# Patient Record
Sex: FEMALE | Race: WHITE | Employment: FULL TIME | URBAN - METROPOLITAN AREA
[De-identification: names, ages, dates, MRNs, and addresses within clinical notes are randomized per-mention and may not be internally consistent; named-entity substitution may affect disease eponyms.]

---

## 2017-06-21 DIAGNOSIS — Z00.00 HEALTH CARE MAINTENANCE: ICD-10-CM

## 2017-06-21 DIAGNOSIS — J30.81 NON-SEASONAL ALLERGIC RHINITIS DUE TO ANIMAL HAIR AND DANDER: ICD-10-CM

## 2017-06-21 LAB
ANION GAP SERPL CALCULATED.3IONS-SCNC: 19 MMOL/L (ref 3–16)
BUN BLDV-MCNC: 10 MG/DL (ref 7–20)
CALCIUM SERPL-MCNC: 9.9 MG/DL (ref 8.3–10.6)
CHLORIDE BLD-SCNC: 100 MMOL/L (ref 99–110)
CHOLESTEROL, TOTAL: 117 MG/DL (ref 0–199)
CO2: 21 MMOL/L (ref 21–32)
CREAT SERPL-MCNC: 0.6 MG/DL (ref 0.6–1.1)
GFR AFRICAN AMERICAN: >60
GFR NON-AFRICAN AMERICAN: >60
GLUCOSE BLD-MCNC: 107 MG/DL (ref 70–99)
HDLC SERPL-MCNC: 54 MG/DL (ref 40–60)
LDL CHOLESTEROL CALCULATED: 56 MG/DL
POTASSIUM SERPL-SCNC: 4.4 MMOL/L (ref 3.5–5.1)
SODIUM BLD-SCNC: 140 MMOL/L (ref 136–145)
TRIGL SERPL-MCNC: 33 MG/DL (ref 0–150)
VLDLC SERPL CALC-MCNC: 7 MG/DL

## 2018-06-21 PROBLEM — L40.9 PSORIASIS: Status: ACTIVE | Noted: 2018-06-21

## 2018-06-21 PROBLEM — B00.9 HERPES SIMPLEX: Status: ACTIVE | Noted: 2018-06-21

## 2019-02-14 PROBLEM — G89.29 CHRONIC PELVIC PAIN IN FEMALE: Status: ACTIVE | Noted: 2019-02-14

## 2019-02-14 PROBLEM — R10.2 CHRONIC PELVIC PAIN IN FEMALE: Status: ACTIVE | Noted: 2019-02-14

## 2019-02-14 PROBLEM — N83.201 BILATERAL OVARIAN CYSTS: Status: ACTIVE | Noted: 2019-02-14

## 2019-02-14 PROBLEM — N83.202 BILATERAL OVARIAN CYSTS: Status: ACTIVE | Noted: 2019-02-14

## 2020-01-10 PROBLEM — N73.0 PELVIC INFLAMMATORY DISEASE, ACUTE: Status: ACTIVE | Noted: 2020-01-10

## 2020-03-02 PROBLEM — R94.31 ABNORMAL EKG: Status: ACTIVE | Noted: 2020-03-02

## 2021-02-23 PROBLEM — F51.01 PRIMARY INSOMNIA: Status: ACTIVE | Noted: 2021-02-23

## 2021-12-09 PROBLEM — E66.9 NON MORBID OBESITY, UNSPECIFIED OBESITY TYPE: Status: ACTIVE | Noted: 2021-12-09

## 2022-06-06 PROBLEM — E66.01 SEVERE OBESITY (BMI 35.0-39.9) WITH COMORBIDITY (HCC): Status: ACTIVE | Noted: 2021-12-09

## 2022-06-08 PROBLEM — G47.33 OSA (OBSTRUCTIVE SLEEP APNEA): Status: ACTIVE | Noted: 2022-06-08

## 2023-04-17 ENCOUNTER — NURSE ONLY (OUTPATIENT)
Dept: ENT CLINIC | Age: 28
End: 2023-04-17
Payer: COMMERCIAL

## 2023-04-17 ENCOUNTER — OFFICE VISIT (OUTPATIENT)
Dept: DERMATOLOGY | Age: 28
End: 2023-04-17
Payer: COMMERCIAL

## 2023-04-17 DIAGNOSIS — L21.9 SEBORRHEIC DERMATITIS: ICD-10-CM

## 2023-04-17 DIAGNOSIS — B00.1 HERPES LABIALIS: Primary | ICD-10-CM

## 2023-04-17 DIAGNOSIS — J30.1 NON-SEASONAL ALLERGIC RHINITIS DUE TO POLLEN: Primary | Chronic | ICD-10-CM

## 2023-04-17 PROCEDURE — G8427 DOCREV CUR MEDS BY ELIG CLIN: HCPCS | Performed by: DERMATOLOGY

## 2023-04-17 PROCEDURE — G8417 CALC BMI ABV UP PARAM F/U: HCPCS | Performed by: DERMATOLOGY

## 2023-04-17 PROCEDURE — 95115 IMMUNOTHERAPY ONE INJECTION: CPT | Performed by: OTOLARYNGOLOGY

## 2023-04-17 PROCEDURE — 1036F TOBACCO NON-USER: CPT | Performed by: DERMATOLOGY

## 2023-04-17 PROCEDURE — 99213 OFFICE O/P EST LOW 20 MIN: CPT | Performed by: DERMATOLOGY

## 2023-04-17 NOTE — PROGRESS NOTES
determined to be abnormal: Head/face. Well appearing. 1.  Clear. 2.  Right nasofacial sulcus with faint erythema. Assessment and Plan     1. Herpes labialis, frequently recurrent - much improved on daily suppressive therapy     Continue Valtrex 500 mg daily. 2. Chronic facial seborrheic dermatitis of the face - under good control    Continue Elidel cream 1 to 2 times daily as needed. Return in about 1 year (around 4/17/2024).     --Yue Carmen MD

## 2023-04-17 NOTE — PROGRESS NOTES
Correct serum vials verified by patient and nurse. Consent obtained and SVT completed. SVT P=13mm wheal, M=7mm wheal.  After obtaining consent, and per orders of Dr Ghada Bowser, injections of Pollen allergy serum held, M,M,Epi,I given by Nic Trejo RN. Patient instructed to remain in clinic for 30 minutes after injection, and to report any adverse reactions to me immediately. No change in patient status post injection. Patient instructed on use of EpiPen, signs of allergic reaction, and rationale for bringing EpiPen to each appointment. Patient verbalized understanding and provided accurate return demonstration of EpiPen use with  pen.

## 2023-04-21 ENCOUNTER — OFFICE VISIT (OUTPATIENT)
Dept: FAMILY MEDICINE CLINIC | Age: 28
End: 2023-04-21

## 2023-04-21 VITALS
SYSTOLIC BLOOD PRESSURE: 108 MMHG | WEIGHT: 237 LBS | HEART RATE: 74 BPM | TEMPERATURE: 98.3 F | OXYGEN SATURATION: 97 % | DIASTOLIC BLOOD PRESSURE: 70 MMHG | HEIGHT: 70 IN | BODY MASS INDEX: 33.93 KG/M2 | RESPIRATION RATE: 14 BRPM

## 2023-04-21 DIAGNOSIS — R23.2 CUTANEOUS FLUSHING DUE TO ALCOHOL: Primary | ICD-10-CM

## 2023-04-21 NOTE — PROGRESS NOTES
Cori Rutledge (:  1995) is a 32 y.o. female,Established patient, here for evaluation of the following chief complaint(s): Other (Requesting referral for allergy testing)         ASSESSMENT/PLAN:  [facility] was seen today for other. 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 Subjective   SUBJECTIVE/OBJECTIVE:  HPI  Pt is a of 32 y.o. female comes in today with   Chief Complaint   Patient presents with    Other     Requesting referral for allergy testing     Rare etoh use. Last few months. Vitals:    23 0858   BP: 108/70   Site: Right Upper Arm   Position: Sitting   Cuff Size: Large Adult   Pulse: 74   Resp: 14   Temp: 98.3 °F (36.8 °C)   TempSrc: Temporal   SpO2: 97%   Weight: 237 lb (107.5 kg)   Height: 5' 10\" (1.778 m)      Review of Systems       Objective   Physical Exam         An electronic signature was used to authenticate this note.     --Maximo Molina MD

## 2023-04-24 ENCOUNTER — NURSE ONLY (OUTPATIENT)
Dept: ENT CLINIC | Age: 28
End: 2023-04-24
Payer: COMMERCIAL

## 2023-04-24 DIAGNOSIS — J30.9 ALLERGIC RHINITIS, UNSPECIFIED SEASONALITY, UNSPECIFIED TRIGGER: Primary | ICD-10-CM

## 2023-04-24 PROCEDURE — 95117 IMMUNOTHERAPY INJECTIONS: CPT | Performed by: STUDENT IN AN ORGANIZED HEALTH CARE EDUCATION/TRAINING PROGRAM

## 2023-04-24 NOTE — PROGRESS NOTES
Correct serum vials verified by patient and nurse. After obtaining consent, and per orders of Dr Erica Pacheco, injections of allergy serum given by Mor Georges RN. Patient instructed to remain in clinic for 30 minutes after injection, and to report any adverse reactions to me immediately. No change in patient status post injection.

## 2023-05-01 ENCOUNTER — NURSE ONLY (OUTPATIENT)
Dept: ENT CLINIC | Age: 28
End: 2023-05-01
Payer: COMMERCIAL

## 2023-05-01 DIAGNOSIS — J30.9 ALLERGIC RHINITIS, UNSPECIFIED SEASONALITY, UNSPECIFIED TRIGGER: Primary | ICD-10-CM

## 2023-05-01 PROCEDURE — 95117 IMMUNOTHERAPY INJECTIONS: CPT | Performed by: STUDENT IN AN ORGANIZED HEALTH CARE EDUCATION/TRAINING PROGRAM

## 2023-05-01 NOTE — PROGRESS NOTES
Correct serum vials verified by patient and nurse. After obtaining consent, and per orders of Dr Rohith Oviedo, injections of allergy serum given by Rashid Pablo RN. Patient instructed to remain in clinic for 30 minutes after injection, and to report any adverse reactions to me immediately. No change in patient status post injection.

## 2023-05-15 ENCOUNTER — NURSE ONLY (OUTPATIENT)
Dept: ENT CLINIC | Age: 28
End: 2023-05-15
Payer: COMMERCIAL

## 2023-05-15 DIAGNOSIS — J30.9 ALLERGIC RHINITIS, UNSPECIFIED SEASONALITY, UNSPECIFIED TRIGGER: Primary | ICD-10-CM

## 2023-05-15 PROCEDURE — 95117 IMMUNOTHERAPY INJECTIONS: CPT | Performed by: STUDENT IN AN ORGANIZED HEALTH CARE EDUCATION/TRAINING PROGRAM

## 2023-05-15 NOTE — PROGRESS NOTES
Correct serum vials verified by patient and nurse. After obtaining consent, and per orders of Dr Mary Baig, injections of allergy serum given by Ash Castro RN. Patient instructed to remain in clinic for 30 minutes after injection, and to report any adverse reactions to me immediately. No change in patient status post injection.

## 2023-05-22 ENCOUNTER — NURSE ONLY (OUTPATIENT)
Dept: ENT CLINIC | Age: 28
End: 2023-05-22
Payer: COMMERCIAL

## 2023-05-22 DIAGNOSIS — J30.9 ALLERGIC RHINITIS, UNSPECIFIED SEASONALITY, UNSPECIFIED TRIGGER: Primary | ICD-10-CM

## 2023-05-22 PROCEDURE — 95117 IMMUNOTHERAPY INJECTIONS: CPT | Performed by: STUDENT IN AN ORGANIZED HEALTH CARE EDUCATION/TRAINING PROGRAM

## 2023-05-22 NOTE — PROGRESS NOTES
Correct serum vials verified by patient and nurse. After obtaining consent, and per orders of Dr Re Mac, injections of allergy serum given by Marshall Stevenson RN. Patient instructed to remain in clinic for 30 minutes after injection, and to report any adverse reactions to me immediately. No change in patient status post injection.

## 2023-05-31 ENCOUNTER — NURSE ONLY (OUTPATIENT)
Dept: ENT CLINIC | Age: 28
End: 2023-05-31
Payer: COMMERCIAL

## 2023-05-31 DIAGNOSIS — J30.9 ALLERGIC RHINITIS, UNSPECIFIED SEASONALITY, UNSPECIFIED TRIGGER: Primary | ICD-10-CM

## 2023-05-31 PROCEDURE — 95117 IMMUNOTHERAPY INJECTIONS: CPT | Performed by: OTOLARYNGOLOGY

## 2023-05-31 NOTE — PROGRESS NOTES
Correct serum vials verified by patient and nurse. After obtaining consent, and per orders of Dr Marilyn Lyons, injections of allergy serum given by Benjamín Ohara MA. Patient instructed to remain in clinic for 30 minutes after injection, and to report any adverse reactions to me immediately. No change in patient status post injection.

## 2023-06-05 ENCOUNTER — NURSE ONLY (OUTPATIENT)
Dept: ENT CLINIC | Age: 28
End: 2023-06-05
Payer: COMMERCIAL

## 2023-06-05 DIAGNOSIS — J30.9 ALLERGIC RHINITIS, UNSPECIFIED SEASONALITY, UNSPECIFIED TRIGGER: Primary | ICD-10-CM

## 2023-06-05 PROCEDURE — 95117 IMMUNOTHERAPY INJECTIONS: CPT | Performed by: OTOLARYNGOLOGY

## 2023-06-05 NOTE — PROGRESS NOTES
Correct serum vials verified by patient and nurse. After obtaining consent, and per orders of Dr Romeo Valverde, injections of allergy serum given by Vitaly Garcia RN. Patient instructed to remain in clinic for 30 minutes after injection, and to report any adverse reactions to me immediately. No change in patient status post injection.

## 2023-06-19 ENCOUNTER — NURSE ONLY (OUTPATIENT)
Dept: ENT CLINIC | Age: 28
End: 2023-06-19
Payer: COMMERCIAL

## 2023-06-19 DIAGNOSIS — J30.9 ALLERGIC RHINITIS, UNSPECIFIED SEASONALITY, UNSPECIFIED TRIGGER: Primary | ICD-10-CM

## 2023-06-19 PROCEDURE — 95117 IMMUNOTHERAPY INJECTIONS: CPT | Performed by: STUDENT IN AN ORGANIZED HEALTH CARE EDUCATION/TRAINING PROGRAM

## 2023-06-19 NOTE — PROGRESS NOTES
Correct serum vials verified by patient and nurse. After obtaining consent, and per orders of Dr Marilyn Lyons, injections of allergy serum given by Sunny Thomas RN. Patient instructed to remain in clinic for 30 minutes after injection, and to report any adverse reactions to me immediately. No change in patient status post injection.

## 2023-07-03 ENCOUNTER — NURSE ONLY (OUTPATIENT)
Dept: ENT CLINIC | Age: 28
End: 2023-07-03
Payer: COMMERCIAL

## 2023-07-03 DIAGNOSIS — J30.9 ALLERGIC RHINITIS, UNSPECIFIED SEASONALITY, UNSPECIFIED TRIGGER: Primary | ICD-10-CM

## 2023-07-03 PROCEDURE — 95117 IMMUNOTHERAPY INJECTIONS: CPT | Performed by: OTOLARYNGOLOGY

## 2023-07-03 NOTE — PROGRESS NOTES
Correct serum vials verified by patient and nurse. After obtaining consent, and per orders of Dr Jamie Rodriguez, injections of allergy serum given by Pino Linda RN. Patient instructed to remain in clinic for 30 minutes after injection, and to report any adverse reactions to me immediately. No change in patient status post injection.

## 2023-07-05 ENCOUNTER — TELEPHONE (OUTPATIENT)
Dept: ENT CLINIC | Age: 28
End: 2023-07-05

## 2023-07-06 NOTE — TELEPHONE ENCOUNTER
Patient has been tolerating allergy desensitization injections without issue. Agree with advancing treatment set to the neck stronger concentration and continuing escalated buildup.     Dr. Arlen Phipps, [facility]   Otolaryngology - Head & Neck [facility]

## 2023-07-10 ENCOUNTER — NURSE ONLY (OUTPATIENT)
Dept: ENT CLINIC | Age: 28
End: 2023-07-10
Payer: COMMERCIAL

## 2023-07-10 ENCOUNTER — NURSE ONLY (OUTPATIENT)
Dept: ENT CLINIC | Age: 28
End: 2023-07-10

## 2023-07-10 DIAGNOSIS — J30.1 NON-SEASONAL ALLERGIC RHINITIS DUE TO POLLEN: Primary | Chronic | ICD-10-CM

## 2023-07-10 PROCEDURE — 95165 ANTIGEN THERAPY SERVICES: CPT | Performed by: STUDENT IN AN ORGANIZED HEALTH CARE EDUCATION/TRAINING PROGRAM

## 2023-07-10 PROCEDURE — 95117 IMMUNOTHERAPY INJECTIONS: CPT | Performed by: STUDENT IN AN ORGANIZED HEALTH CARE EDUCATION/TRAINING PROGRAM

## 2023-07-10 NOTE — PROGRESS NOTES
Correct serum vials verified by patient and nurse. After obtaining consent, and per orders of Dr Dominick Deluna, injections of allergy serum given by Sandra Yuen RN. Patient instructed to remain in clinic for 30 minutes after injection, and to report any adverse reactions to me immediately. No change in patient status post injection.

## 2023-07-17 ENCOUNTER — NURSE ONLY (OUTPATIENT)
Dept: ENT CLINIC | Age: 28
End: 2023-07-17
Payer: COMMERCIAL

## 2023-07-17 DIAGNOSIS — J30.1 NON-SEASONAL ALLERGIC RHINITIS DUE TO POLLEN: Primary | Chronic | ICD-10-CM

## 2023-07-17 PROCEDURE — 95117 IMMUNOTHERAPY INJECTIONS: CPT | Performed by: STUDENT IN AN ORGANIZED HEALTH CARE EDUCATION/TRAINING PROGRAM

## 2023-07-17 NOTE — PROGRESS NOTES
Correct serum vials verified by patient and nurse. Consent obtained and SVT completed. SVT P=8mm wheal, M=10mm wheal.  After obtaining consent, and per orders of Dr Flores Membreno, injections of allergy serum given by MIKE Schwarz, [address] [place] [address]. Patient instructed to remain in clinic for 30 minutes after injection, and to report any adverse reactions to me immediately. No change in patient status post injection.

## 2023-07-24 ENCOUNTER — NURSE ONLY (OUTPATIENT)
Dept: ENT CLINIC | Age: 28
End: 2023-07-24
Payer: COMMERCIAL

## 2023-07-24 DIAGNOSIS — J30.1 NON-SEASONAL ALLERGIC RHINITIS DUE TO POLLEN: Primary | Chronic | ICD-10-CM

## 2023-07-24 PROCEDURE — 95117 IMMUNOTHERAPY INJECTIONS: CPT | Performed by: OTOLARYNGOLOGY

## 2023-07-24 NOTE — PROGRESS NOTES
Correct serum vials verified by patient and nurse. After obtaining consent, and per orders of Dr Rand Merrill, injections of allergy serum given by MIKE Burk, [address] [place] [address]. Patient instructed to remain in clinic for 30 minutes after injection, and to report any adverse reactions to me immediately. No change in patient status post injection.

## 2023-07-31 ENCOUNTER — NURSE ONLY (OUTPATIENT)
Dept: ENT CLINIC | Age: 28
End: 2023-07-31
Payer: COMMERCIAL

## 2023-07-31 DIAGNOSIS — J30.1 NON-SEASONAL ALLERGIC RHINITIS DUE TO POLLEN: Primary | Chronic | ICD-10-CM

## 2023-07-31 PROCEDURE — 95117 IMMUNOTHERAPY INJECTIONS: CPT | Performed by: STUDENT IN AN ORGANIZED HEALTH CARE EDUCATION/TRAINING PROGRAM

## 2023-07-31 NOTE — PROGRESS NOTES
Correct serum vials verified by patient and nurse. After obtaining consent, and per orders of Dr Stacia Bernal, injections of allergy serum given by MIKE Tovar, [address] [place] [address]. Patient instructed to remain in clinic for 30 minutes after injection, and to report any adverse reactions to me immediately. No change in patient status post injection.

## 2023-08-07 ENCOUNTER — NURSE ONLY (OUTPATIENT)
Dept: ENT CLINIC | Age: 28
End: 2023-08-07
Payer: COMMERCIAL

## 2023-08-07 DIAGNOSIS — J30.1 NON-SEASONAL ALLERGIC RHINITIS DUE TO POLLEN: Primary | Chronic | ICD-10-CM

## 2023-08-07 PROCEDURE — 95117 IMMUNOTHERAPY INJECTIONS: CPT | Performed by: OTOLARYNGOLOGY

## 2023-08-07 NOTE — PROGRESS NOTES
Correct serum vials verified by patient and nurse. After obtaining consent, and per orders of Dr Flores Membreno, injections of allergy serum given by Razia Holguin MA. Patient instructed to remain in clinic for 30 minutes after injection, and to report any adverse reactions to me immediately. No change in patient status post injection.

## 2023-08-14 ENCOUNTER — NURSE ONLY (OUTPATIENT)
Dept: ENT CLINIC | Age: 28
End: 2023-08-14
Payer: COMMERCIAL

## 2023-08-14 DIAGNOSIS — J30.1 NON-SEASONAL ALLERGIC RHINITIS DUE TO POLLEN: Primary | Chronic | ICD-10-CM

## 2023-08-14 PROCEDURE — 95117 IMMUNOTHERAPY INJECTIONS: CPT | Performed by: STUDENT IN AN ORGANIZED HEALTH CARE EDUCATION/TRAINING PROGRAM

## 2023-08-14 RX ORDER — VALACYCLOVIR HYDROCHLORIDE 1 G/1
TABLET, FILM COATED ORAL
Qty: 20 TABLET | Refills: 0 | Status: SHIPPED | OUTPATIENT
Start: 2023-08-14

## 2023-08-14 NOTE — PROGRESS NOTES
Correct serum vials verified by patient and nurse. After obtaining consent, and per orders of Dr Brenda Easley, injections of allergy serum given by MIKE Lopez, [address] [place] [address]. Patient instructed to remain in clinic for 30 minutes after injection, and to report any adverse reactions to me immediately. No change in patient status post injection.

## 2023-08-21 ENCOUNTER — NURSE ONLY (OUTPATIENT)
Dept: ENT CLINIC | Age: 28
End: 2023-08-21
Payer: COMMERCIAL

## 2023-08-21 DIAGNOSIS — J30.1 NON-SEASONAL ALLERGIC RHINITIS DUE TO POLLEN: Primary | Chronic | ICD-10-CM

## 2023-08-21 PROCEDURE — 95117 IMMUNOTHERAPY INJECTIONS: CPT | Performed by: STUDENT IN AN ORGANIZED HEALTH CARE EDUCATION/TRAINING PROGRAM

## 2023-08-21 NOTE — PROGRESS NOTES
Correct serum vials verified by patient and nurse. After obtaining consent, and per orders of Dr Marcial Oseguera, injections of allergy serum given by MIKE Wang, [address] [place] [address]. Patient instructed to remain in clinic for 30 minutes after injection, and to report any adverse reactions to me immediately. No change in patient status post injection.

## 2023-08-24 ENCOUNTER — OFFICE VISIT (OUTPATIENT)
Dept: ENT CLINIC | Age: 28
End: 2023-08-24
Payer: COMMERCIAL

## 2023-08-24 VITALS
SYSTOLIC BLOOD PRESSURE: 111 MMHG | WEIGHT: 246 LBS | TEMPERATURE: 97.5 F | OXYGEN SATURATION: 97 % | HEIGHT: 70 IN | BODY MASS INDEX: 35.22 KG/M2 | HEART RATE: 92 BPM | DIASTOLIC BLOOD PRESSURE: 79 MMHG

## 2023-08-24 DIAGNOSIS — J30.9 ALLERGIC RHINITIS, UNSPECIFIED SEASONALITY, UNSPECIFIED TRIGGER: Primary | ICD-10-CM

## 2023-08-24 PROCEDURE — G8417 CALC BMI ABV UP PARAM F/U: HCPCS | Performed by: STUDENT IN AN ORGANIZED HEALTH CARE EDUCATION/TRAINING PROGRAM

## 2023-08-24 PROCEDURE — 99213 OFFICE O/P EST LOW 20 MIN: CPT | Performed by: STUDENT IN AN ORGANIZED HEALTH CARE EDUCATION/TRAINING PROGRAM

## 2023-08-24 PROCEDURE — 1036F TOBACCO NON-USER: CPT | Performed by: STUDENT IN AN ORGANIZED HEALTH CARE EDUCATION/TRAINING PROGRAM

## 2023-08-24 PROCEDURE — G8427 DOCREV CUR MEDS BY ELIG CLIN: HCPCS | Performed by: STUDENT IN AN ORGANIZED HEALTH CARE EDUCATION/TRAINING PROGRAM

## 2023-08-24 RX ORDER — EPINEPHRINE 0.3 MG/.3ML
0.3 INJECTION SUBCUTANEOUS ONCE
Qty: 0.3 ML | Refills: 0 | Status: SHIPPED
Start: 2023-08-24 | End: 2023-08-25

## 2023-08-24 NOTE — PROGRESS NOTES
[address] [facility] FOLLOW-UP VISIT      Patient Name: Rafal [address] Record Number:  [id number]  Primary Care Physician:  Brandie Armando MD    ChiefComplaint     Chief Complaint   Patient presents with    Post-Op Check     F/u septoplasty, needs refill on epi pen       History of Present Illness     Mary Adkins is an 25 y.o. female previously seen for allergic rhinitis, deviated nasal septum, bilateral inferior turbinate hypertrophy. S/p septoplasty and bilateral inferior turbinate reduction on 1/4/2023. Interval History:   A couple days after getting shots will get itching around the site of injection and runny nose. Will also get tired after her shots. No breathing issues. No face or lip swelling. Breathing better through nose since surgery with occasional crusting in the nostrils.      Past Medical History     Past Medical History:   Diagnosis Date    Eczema     Endometriosis     Environmental allergies     IBS (irritable bowel syndrome)     Ovarian cyst     Psoriasis     Sleep apnea     does not currently use CPAP       Past Surgical History     Past Surgical History:   Procedure Laterality Date    ANTERIOR CRUCIATE LIGAMENT REPAIR Left 2011    APPENDECTOMY  2008    APPENDECTOMY      OVARIAN CYST SURGERY Bilateral     OVARY SURGERY Bilateral     removal of endometriosis from ovaries    SALPINGO-OOPHORECTOMY Left 02/14/2019    DIAGNOSTIC LAPAROSCOPY, ROBOTIC LEFT AND RIGHT OVARIAN  ENDOMETRIOMAS AND REMOVAL OF ENDOMETRIOSIS performed by Pola Murguia MD at [facility] patient still has ovaries    SINUS ENDOSCOPY Bilateral 1/4/2023    SEPTOPLASTY; BILATERAL INTERIOR TURBINATE REDUCTION performed by Otis Matta DO at [address]       Family History     Family History   Problem Relation Age of Onset    Diabetes Mother     Hypertension Mother     Migraines Mother     Asthma Mother     Arthritis Mother     Diabetes Father     Sleep Apnea Father

## 2023-08-25 ENCOUNTER — TELEPHONE (OUTPATIENT)
Dept: ENT CLINIC | Age: 28
End: 2023-08-25

## 2023-08-25 DIAGNOSIS — J30.9 ALLERGIC RHINITIS, UNSPECIFIED SEASONALITY, UNSPECIFIED TRIGGER: Primary | ICD-10-CM

## 2023-08-25 DIAGNOSIS — J30.9 ALLERGIC RHINITIS, UNSPECIFIED SEASONALITY, UNSPECIFIED TRIGGER: ICD-10-CM

## 2023-08-25 RX ORDER — EPINEPHRINE 0.3 MG/.3ML
INJECTION SUBCUTANEOUS
Qty: 2 EACH | Refills: 0 | Status: SHIPPED | OUTPATIENT
Start: 2023-08-25

## 2023-08-25 RX ORDER — EPINEPHRINE 0.3 MG/.3ML
0.3 INJECTION SUBCUTANEOUS ONCE
Qty: 0.3 ML | Refills: 0 | Status: SHIPPED | OUTPATIENT
Start: 2023-08-25 | End: 2023-08-25

## 2023-08-25 NOTE — TELEPHONE ENCOUNTER
If you could please send her epi pen to the new pharmacy on file. She will need the pen for her immunotherapy on Monday.

## 2023-08-28 ENCOUNTER — NURSE ONLY (OUTPATIENT)
Dept: ENT CLINIC | Age: 28
End: 2023-08-28
Payer: COMMERCIAL

## 2023-08-28 DIAGNOSIS — J30.1 NON-SEASONAL ALLERGIC RHINITIS DUE TO POLLEN: Primary | Chronic | ICD-10-CM

## 2023-08-28 PROCEDURE — 95117 IMMUNOTHERAPY INJECTIONS: CPT | Performed by: OTOLARYNGOLOGY

## 2023-08-28 NOTE — PROGRESS NOTES
Correct serum vials verified by patient and nurse. After obtaining consent, and per orders of Dr Latha Tobar, injections of allergy serum given by MIKE More, [address] [place] [address]. Patient instructed to remain in clinic for 30 minutes after injection, and to report any adverse reactions to me immediately. No change in patient status post injection.

## 2023-09-05 ENCOUNTER — NURSE ONLY (OUTPATIENT)
Dept: ENT CLINIC | Age: 28
End: 2023-09-05
Payer: COMMERCIAL

## 2023-09-05 DIAGNOSIS — J30.1 NON-SEASONAL ALLERGIC RHINITIS DUE TO POLLEN: Primary | Chronic | ICD-10-CM

## 2023-09-05 PROCEDURE — 95117 IMMUNOTHERAPY INJECTIONS: CPT | Performed by: STUDENT IN AN ORGANIZED HEALTH CARE EDUCATION/TRAINING PROGRAM

## 2023-09-05 NOTE — PROGRESS NOTES
Correct serum vials verified by patient and nurse. After obtaining consent, and per orders of Dr Geeta Head, injections of allergy serum given by MIKE Busch, [facility]. Patient instructed to remain in clinic for 30 minutes after injection, and to report any adverse reactions to me immediately. No change in patient status post injection.

## 2023-09-11 ENCOUNTER — NURSE ONLY (OUTPATIENT)
Dept: ENT CLINIC | Age: 28
End: 2023-09-11
Payer: COMMERCIAL

## 2023-09-11 DIAGNOSIS — J30.1 NON-SEASONAL ALLERGIC RHINITIS DUE TO POLLEN: Primary | Chronic | ICD-10-CM

## 2023-09-11 PROCEDURE — 95117 IMMUNOTHERAPY INJECTIONS: CPT | Performed by: STUDENT IN AN ORGANIZED HEALTH CARE EDUCATION/TRAINING PROGRAM

## 2023-09-11 RX ORDER — VALACYCLOVIR HYDROCHLORIDE 500 MG/1
500 TABLET, FILM COATED ORAL DAILY
Qty: 30 TABLET | Refills: 6 | Status: SHIPPED | OUTPATIENT
Start: 2023-09-11

## 2023-09-11 NOTE — PROGRESS NOTES
Correct serum vials verified by patient and nurse. After obtaining consent, and per orders of Dr Robert Teresa, injections of allergy serum given by MIKE Bliss, [address] [place] [address]. Patient instructed to remain in clinic for 30 minutes after injection, and to report any adverse reactions to me immediately. No change in patient status post injection.

## 2023-09-18 ENCOUNTER — TELEPHONE (OUTPATIENT)
Dept: ENT CLINIC | Age: 28
End: 2023-09-18

## 2023-09-18 ENCOUNTER — NURSE ONLY (OUTPATIENT)
Dept: ENT CLINIC | Age: 28
End: 2023-09-18
Payer: COMMERCIAL

## 2023-09-18 DIAGNOSIS — J30.1 NON-SEASONAL ALLERGIC RHINITIS DUE TO POLLEN: Primary | Chronic | ICD-10-CM

## 2023-09-18 PROCEDURE — 95117 IMMUNOTHERAPY INJECTIONS: CPT | Performed by: STUDENT IN AN ORGANIZED HEALTH CARE EDUCATION/TRAINING PROGRAM

## 2023-09-18 NOTE — TELEPHONE ENCOUNTER
Patient has been tolerating allergy desensitization injections without issue. Agree with advancing treatment set to the neck stronger concentration and continuing escalated buildup.     Dr. José Miguel Jacinto, [facility]   Otolaryngology - Head & Neck [facility]

## 2023-09-18 NOTE — PROGRESS NOTES
Correct serum vials verified by patient and nurse. After obtaining consent, and per orders of Dr Diogo Klein, injections of allergy serum given by MIKE Little, [address] [place] [address]. Patient instructed to remain in clinic for 30 minutes after injection, and to report any adverse reactions to me immediately. No change in patient status post injection.

## 2023-09-21 ENCOUNTER — NURSE ONLY (OUTPATIENT)
Dept: ENT CLINIC | Age: 28
End: 2023-09-21
Payer: COMMERCIAL

## 2023-09-21 DIAGNOSIS — J30.1 NON-SEASONAL ALLERGIC RHINITIS DUE TO POLLEN: Primary | Chronic | ICD-10-CM

## 2023-09-21 PROCEDURE — 95165 ANTIGEN THERAPY SERVICES: CPT | Performed by: STUDENT IN AN ORGANIZED HEALTH CARE EDUCATION/TRAINING PROGRAM

## 2023-09-25 ENCOUNTER — NURSE ONLY (OUTPATIENT)
Dept: ENT CLINIC | Age: 28
End: 2023-09-25
Payer: COMMERCIAL

## 2023-09-25 DIAGNOSIS — J30.1 NON-SEASONAL ALLERGIC RHINITIS DUE TO POLLEN: Primary | Chronic | ICD-10-CM

## 2023-09-25 PROCEDURE — 95117 IMMUNOTHERAPY INJECTIONS: CPT | Performed by: OTOLARYNGOLOGY

## 2023-09-25 NOTE — PROGRESS NOTES
Correct serum vials verified by patient and nurse. After obtaining consent, and per orders of Dr Luis Felipe Conte, injections of allergy serum given by MIKE Castellanos Reeves, [address] [place] [address]. Patient instructed to remain in clinic for 30 minutes after injection, and to report any adverse reactions to me immediately. No change in patient status post injection.

## 2023-10-02 ENCOUNTER — NURSE ONLY (OUTPATIENT)
Dept: ENT CLINIC | Age: 28
End: 2023-10-02
Payer: COMMERCIAL

## 2023-10-02 PROCEDURE — 95115 IMMUNOTHERAPY ONE INJECTION: CPT | Performed by: STUDENT IN AN ORGANIZED HEALTH CARE EDUCATION/TRAINING PROGRAM

## 2023-10-02 NOTE — PROGRESS NOTES
Correct serum vials verified by patient and nurse. Consent obtained and SVT completed. SVT P=13+ mm wheal, M=9mm wheal.  After obtaining consent, and per orders of Dr Liane Wallace, held Pollen injection today and allergy serum of M,M,Epi,I given by Kinsey Billingsley RN. Patient instructed to remain in clinic for 30 minutes after injection, and to report any adverse reactions to me immediately. No change in patient status post injection.

## 2023-10-09 ENCOUNTER — NURSE ONLY (OUTPATIENT)
Dept: ENT CLINIC | Age: 28
End: 2023-10-09
Payer: COMMERCIAL

## 2023-10-09 DIAGNOSIS — J30.1 NON-SEASONAL ALLERGIC RHINITIS DUE TO POLLEN: Primary | Chronic | ICD-10-CM

## 2023-10-09 PROCEDURE — 95117 IMMUNOTHERAPY INJECTIONS: CPT | Performed by: OTOLARYNGOLOGY

## 2023-10-09 NOTE — PROGRESS NOTES
Correct serum vials verified by patient and nurse. After obtaining consent, and per orders of Dr Luke Hernandez, injections of allergy serum given by Jeannie Mckeon RN. Patient instructed to remain in clinic for 30 minutes after injection, and to report any adverse reactions to me immediately. No change in patient status post injection.

## 2023-10-23 ENCOUNTER — NURSE ONLY (OUTPATIENT)
Dept: ENT CLINIC | Age: 28
End: 2023-10-23
Payer: COMMERCIAL

## 2023-10-23 DIAGNOSIS — J30.1 NON-SEASONAL ALLERGIC RHINITIS DUE TO POLLEN: Primary | Chronic | ICD-10-CM

## 2023-10-23 PROCEDURE — 95117 IMMUNOTHERAPY INJECTIONS: CPT | Performed by: STUDENT IN AN ORGANIZED HEALTH CARE EDUCATION/TRAINING PROGRAM

## 2023-10-23 NOTE — PROGRESS NOTES
Correct serum vials verified by patient and nurse. After obtaining consent, and per orders of Dr Ron Hernandez, injections of allergy serum given by Gustavo Bradley RN. Patient instructed to remain in clinic for 30 minutes after injection, and to report any adverse reactions to me immediately. No change in patient status post injection.

## 2023-10-30 ENCOUNTER — NURSE ONLY (OUTPATIENT)
Dept: ENT CLINIC | Age: 28
End: 2023-10-30
Payer: COMMERCIAL

## 2023-10-30 DIAGNOSIS — J30.1 SEASONAL ALLERGIC RHINITIS DUE TO POLLEN: Primary | ICD-10-CM

## 2023-10-30 PROCEDURE — 95117 IMMUNOTHERAPY INJECTIONS: CPT | Performed by: STUDENT IN AN ORGANIZED HEALTH CARE EDUCATION/TRAINING PROGRAM

## 2023-10-30 NOTE — PROGRESS NOTES
Correct serum vials verified by patient and nurse. After obtaining consent, and per orders of Dr Mallorie Zamora, injections of allergy serum given by Beverly Newell RN. Patient instructed to remain in clinic for 30 minutes after injection, and to report any adverse reactions to me immediately. No change in patient status post injection.